# Patient Record
Sex: FEMALE | Race: OTHER | HISPANIC OR LATINO | Employment: UNEMPLOYED | URBAN - METROPOLITAN AREA
[De-identification: names, ages, dates, MRNs, and addresses within clinical notes are randomized per-mention and may not be internally consistent; named-entity substitution may affect disease eponyms.]

---

## 2020-11-11 ENCOUNTER — HOSPITAL ENCOUNTER (EMERGENCY)
Facility: MEDICAL CENTER | Age: 30
End: 2020-11-12
Attending: EMERGENCY MEDICINE | Admitting: EMERGENCY MEDICINE
Payer: OTHER GOVERNMENT

## 2020-11-11 ENCOUNTER — APPOINTMENT (OUTPATIENT)
Dept: RADIOLOGY | Facility: MEDICAL CENTER | Age: 30
End: 2020-11-11
Attending: EMERGENCY MEDICINE
Payer: OTHER GOVERNMENT

## 2020-11-11 DIAGNOSIS — Z20.822 SUSPECTED 2019 NOVEL CORONAVIRUS INFECTION: ICD-10-CM

## 2020-11-11 DIAGNOSIS — R11.0 NAUSEA WITHOUT VOMITING: ICD-10-CM

## 2020-11-11 DIAGNOSIS — J18.9 COMMUNITY ACQUIRED PNEUMONIA OF LEFT LOWER LOBE OF LUNG: ICD-10-CM

## 2020-11-11 LAB — COVID ORDER STATUS COVID19: NORMAL

## 2020-11-11 PROCEDURE — 71045 X-RAY EXAM CHEST 1 VIEW: CPT

## 2020-11-11 PROCEDURE — 87631 RESP VIRUS 3-5 TARGETS: CPT

## 2020-11-11 PROCEDURE — 99283 EMERGENCY DEPT VISIT LOW MDM: CPT

## 2020-11-11 PROCEDURE — A9270 NON-COVERED ITEM OR SERVICE: HCPCS | Performed by: EMERGENCY MEDICINE

## 2020-11-11 PROCEDURE — 700102 HCHG RX REV CODE 250 W/ 637 OVERRIDE(OP): Performed by: EMERGENCY MEDICINE

## 2020-11-11 PROCEDURE — 700111 HCHG RX REV CODE 636 W/ 250 OVERRIDE (IP): Performed by: EMERGENCY MEDICINE

## 2020-11-11 PROCEDURE — 87651 STREP A DNA AMP PROBE: CPT

## 2020-11-11 PROCEDURE — U0003 INFECTIOUS AGENT DETECTION BY NUCLEIC ACID (DNA OR RNA); SEVERE ACUTE RESPIRATORY SYNDROME CORONAVIRUS 2 (SARS-COV-2) (CORONAVIRUS DISEASE [COVID-19]), AMPLIFIED PROBE TECHNIQUE, MAKING USE OF HIGH THROUGHPUT TECHNOLOGIES AS DESCRIBED BY CMS-2020-01-R: HCPCS

## 2020-11-11 RX ORDER — ACETAMINOPHEN 500 MG
1000 TABLET ORAL ONCE
Status: COMPLETED | OUTPATIENT
Start: 2020-11-11 | End: 2020-11-11

## 2020-11-11 RX ORDER — ONDANSETRON 4 MG/1
4 TABLET, ORALLY DISINTEGRATING ORAL ONCE
Status: COMPLETED | OUTPATIENT
Start: 2020-11-11 | End: 2020-11-11

## 2020-11-11 RX ORDER — IBUPROFEN 600 MG/1
600 TABLET ORAL ONCE
Status: COMPLETED | OUTPATIENT
Start: 2020-11-11 | End: 2020-11-11

## 2020-11-11 RX ADMIN — IBUPROFEN 600 MG: 600 TABLET, FILM COATED ORAL at 22:48

## 2020-11-11 RX ADMIN — ONDANSETRON 4 MG: 4 TABLET, ORALLY DISINTEGRATING ORAL at 22:48

## 2020-11-11 RX ADMIN — ACETAMINOPHEN 1000 MG: 500 TABLET ORAL at 22:48

## 2020-11-11 SDOH — HEALTH STABILITY: MENTAL HEALTH: HOW OFTEN DO YOU HAVE A DRINK CONTAINING ALCOHOL?: NEVER

## 2020-11-11 NOTE — LETTER
11/12/2020               Richard Angel  [address] [place] [address]        Dear Richard (MR#[id number]),    This letter is to inform you that your COVID-19 test result is NEGATIVE.  This means that the virus that causes COVID-19 was not found in your sample.      A review of your test during your recent visit requires that we notify you of the following:    Your nasal swab was negative for the novel coronavirus (COVID-19).     You are encouraged to stay at home until you have had no fever for 24 hours without the use of fever reducing medications and your symptoms are improving. There is no need for further self-quarantine for 14 days for COVID-19 unless otherwise directed by the Health Department.     For any further questions regarding COVID-19, please contact the [facility] at [phone].  Thank you for your cooperation in the matter.      Sincerely,      The [facility] Team

## 2020-11-11 NOTE — LETTER
"11/12/2020               Richard Mezaaj Angel  [address] [place] [address]        Dear Richard (MR#[id number]),    This letter is to inform you that your COVID-19 test result is POSITIVE.  This means that the virus that causes COVID-19 was found in your sample.      SARS-CoV-2 Source   Date Value Ref Range Status   11/11/2020 NP Swab  Final     SARS-CoV-2 by PCR   Date Value Ref Range Status   11/11/2020 DETECTED (AA)  Final     Comment:     PATIENTS: Important information regarding your results and instructions can  be found at [email or web address]   \"After your  Covid-19 Test\"  **The [employer] COVID-19 SARS-CoV-2 test has been made available for use under  the Emergency Use Authorization (EUA) only.         The Health Department will be in contact with you soon.  You are encouraged to continue to quarantine and self-isolate according to the CDC guidance unless otherwise directed by the Health Department.  Per the CDC, you should continue to quarantine until at least 10 days have passed since your symptoms first appeared and at least 24 hours have passed since your fever resolved without the use of fever-reducing medications. Your other symptoms of COVID-19 should also be improving (loss of taste and smell may persist for weeks or months after recovery and should not delay the end of isolation).  You are advised to return to the ER for worsening symptoms including difficulty breathing, ongoing fever, weakness or chest pain.    Once any symptoms have resolved, it may be possible to donate plasma to help others that are currently ill with COVID-19. To learn more and apply, please contact the  at [phone] or via e-mail at [email or web address].    For any further questions regarding COVID-19, please contact the [facility] at [phone].  Thank you for your cooperation in the matter.      Sincerely,    [facility]" Team

## 2020-11-12 VITALS
WEIGHT: 193.34 LBS | SYSTOLIC BLOOD PRESSURE: 105 MMHG | BODY MASS INDEX: 27.07 KG/M2 | HEIGHT: 71 IN | DIASTOLIC BLOOD PRESSURE: 55 MMHG | RESPIRATION RATE: 20 BRPM | TEMPERATURE: 96.6 F | HEART RATE: 71 BPM | OXYGEN SATURATION: 97 %

## 2020-11-12 LAB
FLUAV RNA SPEC QL NAA+PROBE: NEGATIVE
FLUBV RNA SPEC QL NAA+PROBE: NEGATIVE
RSV RNA SPEC QL NAA+PROBE: NEGATIVE
S PYO DNA SPEC NAA+PROBE: NOT DETECTED
SARS-COV-2 RNA RESP QL NAA+PROBE: DETECTED
SPECIMEN SOURCE: ABNORMAL

## 2020-11-12 RX ORDER — GUAIFENESIN AND DEXTROMETHORPHAN HYDROBROMIDE 100; 10 MG/5ML; MG/5ML
10 SOLUTION ORAL EVERY 6 HOURS PRN
Qty: 840 ML | Refills: 0 | Status: SHIPPED | OUTPATIENT
Start: 2020-11-12

## 2020-11-12 RX ORDER — AMOXICILLIN 500 MG/1
1000 CAPSULE ORAL 3 TIMES DAILY
Qty: 30 CAP | Refills: 0 | Status: SHIPPED | OUTPATIENT
Start: 2020-11-12 | End: 2020-11-17

## 2020-11-12 RX ORDER — ONDANSETRON 4 MG/1
4 TABLET, ORALLY DISINTEGRATING ORAL EVERY 6 HOURS PRN
Qty: 8 TAB | Refills: 0 | Status: SHIPPED | OUTPATIENT
Start: 2020-11-12

## 2020-11-12 NOTE — NON-PROVIDER
"CHIEF COMPLAINT  Chief Complaint   Patient presents with   • Fever   • Body Aches   • Headache   • Cough       HPI  Richard Angel is a 30 y.o. male who presents following 6 days of progressively worsening fever, dry cough, muscle ache, headache stating \"my head feels like it is going to explode.\" He has had intermittent loss of taste and smell with the fever. He has also experienced nausea in the last 24 hours, and that he has not eaten a lot. He denies vomiting and diarrhea. He denies any sick contacts, and has recently arrived here from [place]; approx two months ago.     REVIEW OF SYSTEMS  Positives as above. Pertinent negatives include denies shortness of breath, vomiting abdominal pain, chest pain, productive cough, uni/bilateral lower extremity edema, changes in hearing/vision, joint pain.    All other review of systems are negative    PAST MEDICAL HISTORY       SOCIAL HISTORY  Social History     Tobacco Use   • Smoking status: Never Smoker   Substance and Sexual Activity   • Alcohol use: Never     Frequency: Never   • Drug use: Never   • Sexual activity: Not on file       SURGICAL HISTORY  patient denies any surgical history    CURRENT MEDICATIONS  Home Medications     Reviewed by Priya Sauceda R.N. (Registered Nurse) on 11/11/20 at [id number]  Med List Status: Partial   Medication Last Dose Status        Patient Fritz Taking any Medications                       ALLERGIES  No Known Allergies    PHYSICAL EXAM  VITAL SIGNS: /57   Pulse 80   Temp (!) 39.2 °C (102.6 °F) (Oral)   Resp (!) 21   Ht 1.8 m (5' 10.87\")   Wt 87.7 kg (193 lb 5.5 oz)   SpO2 96%   BMI 27.07 kg/m²    Pulse ox interpretation: 96% and pulsatile I interpret this pulse ox as normal.  Constitutional: Patient appears uncomfortable Alert in no apparent distress.  HENT: Normocephalic, Atraumatic, MMM; erythema in the oropharynx   Eyes: PERound. Conjunctiva normal, non-icteric.   Heart: Regular rate and rhythm, no murmurs.  "   Lungs: Clear to auscultation bilaterally. No resp distress, breath sounds equal  Abdomen: Non-tender, non-distended, normal bowel sounds  EXT: bilaterally equal upper and lower extremites without edema and deformity  Skin: Hot, Dry, No erythema, No rash.   Neurologic: Alert and oriented, Grossly non-focal.       DIFFERENTIAL DIAGNOSIS AND WORK UP PLAN    This is a 30 y.o. adult who presents with following 6 days of progressively worsening fever, dry cough, myalgias, and headache. The presentation is suspicious for infection particularly in the upper or lower airways. With the COVID-19 pandemic it does seem likely that the patient this explain the patients symptoms; however there is a possibility that this could be influenza; we will need to test for both, and chest x-ray to assess the extent of infection. The patients erythema on oropharynx does present the a less likely, but reasonable explanation for the patients symptoms with pharyngitis as he has presented without in changes in his respiratory effort. He febrile, and complaining of body aches we will treat him with APAP and IBU.    Pertinent Lab Findings  Results for orders placed or performed during the hospital encounter of 11/11/20   COVID/SARS CoV-2 PCR    Specimen: Nasopharyngeal; Respirate   Result Value Ref Range    COVID Order Status Received    Group A Strep by PCR   Result Value Ref Range    Group A Strep by PCR Not Detected Not Detected   SARS-CoV-2, PCR (In-House)   Result Value Ref Range    SARS-CoV-2 Source NP Swab    Flu and RSV by PCR   Result Value Ref Range    Influenza virus A RNA Negative Negative    Influenza virus B, PCR Negative Negative    RSV, PCR Negative Negative        Radiology  DX-CHEST-PORTABLE (1 VIEW)   Final Result      Minimal and equivocal left basilar opacities as noted above.        The radiologist's interpretation of all radiological studies have been reviewed by me.    COURSE & MEDICAL DECISION MAKING  Pertinent Labs &  Imaging studies reviewed. (See chart for details)    11:00 AM The patient was examined at his bedside   services were used in the patient's primary language of Irish.     Name or Number: Fabiola [id number]  Mode of interpretation: iPad    Content of Interpretation:  Patient history collection and examination     11:56 PM Patient chest X-ray reviewed; possible left basilar infiltrates.   1:24 AM Patient updated as Strep, and Influenza are negative. The plan will be to discharge home with ABX with instructions to isolate.      services were used in the patient's primary language of Irish.     Name or Number: Eunice [id number]  Mode of interpretation: iPad    Content of Interpretation:  Discharge instructions     This is a 30 y.o. adult who presents following 6 days of progressively worsening fever, dry cough, myalgias, and headache. His Strep and Influenza are negative; his CXR shows some mild left basilar opacities in the LLL. The patient will receive ABX, Zofran for N/V, and a cough suppressant for comfort. The patient was instructed that he needed to take the APAP at interval to manage his fever and reduce his discomfort. He is to take his medication as prescribed and return for new or worsening symptoms.           I verified that the patient was wearing a mask and I was wearing appropriate PPE every time I entered the room. The patient's mask was on the patient at all times during my encounter except for a brief view of the oropharynx.        FINAL IMPRESSION  1. Community acquired pneumonia of left lower lobe of lung     2. Nausea without vomiting                Electronically signed by: Dada Peraza, Student, 11/11/2020 10:32 PM with Nicolette Hunt MD    This dictation has been created using voice recognition software and/or scribes. The accuracy of the dictation is limited by the abilities of the software and the expertise of the scribes. I expect there may be some  errors of grammar and possibly content. I made every attempt to manually correct the errors within my dictation. However, errors related to voice recognition software and/or scribes may still exist and should be interpreted within the appropriate context.

## 2020-11-12 NOTE — ED TRIAGE NOTES
"Richard Mezaaj Angel  30 y.o. male  Chief Complaint   Patient presents with   • Fever   • Body Aches   • Headache   • Cough       Patient ambulatory to triage with a steady gait for above complaint. Pt is visiting family from [place] x 2 months. Symtoms started on Saturday. Pt has been taking unk abx that his sister had since onset of symptoms. Denies CP, SOB. Language line  used.     Patient is alert and oriented, speaking in full sentences, following commands, and responding appropriately to questions. Educated on triage process and instructed patient to alert staff to any changes in condition or worsening symptoms.     /88   Pulse (!) 110   Temp (!) 39.2 °C (102.6 °F) (Oral)   Resp 18   Ht 1.8 m (5' 10.87\")   Wt 87.7 kg (193 lb 5.5 oz)   SpO2 96%   BMI 27.07 kg/m²       "

## 2020-11-13 NOTE — ED NOTES
"COVID-19 Test Follow-Up  11/12/20    Patient is positive for COVID-19.      SARS-CoV-2 Source   Date Value Ref Range Status   11/11/2020 NP Swab  Final     SARS-CoV-2 by PCR   Date Value Ref Range Status   11/11/2020 DETECTED (AA)  Final     Comment:     PATIENTS: Important information regarding your results and instructions can  be found at [email or web address]   \"After your  Covid-19 Test\"  **The [employer] COVID-19 SARS-CoV-2 test has been made available for use under  the Emergency Use Authorization (EUA) only.       I have informed the patient of the positive result by letter and that the Health Dept would be in contact soon. Instructed them to continue to quarantine and self-isolate according with the CDC guidance or as otherwise directed by the Health Dept.    Per the CDC, they should continue to self-isolate until:   • At least 10 days since symptoms first appeared and  • At least 24 hours with no fever without fever-reducing medication and  • Other symptoms of COVID-19 are improving (Loss of taste and smell may persist for weeks or months after recovery and need not delay the end of isolation)    They are advised to return to the ER for worsening symptoms including difficulty breathing, ongoing fever, weakness or chest pain.    Jayne Lee, PharmD    "